# Patient Record
Sex: FEMALE | Race: WHITE
[De-identification: names, ages, dates, MRNs, and addresses within clinical notes are randomized per-mention and may not be internally consistent; named-entity substitution may affect disease eponyms.]

---

## 2019-04-13 ENCOUNTER — HOSPITAL ENCOUNTER (INPATIENT)
Dept: HOSPITAL 26 - MED | Age: 39
LOS: 2 days | Discharge: HOME | DRG: 284 | End: 2019-04-15
Attending: GENERAL PRACTICE | Admitting: GENERAL PRACTICE
Payer: MEDICAID

## 2019-04-13 VITALS — WEIGHT: 179 LBS | BODY MASS INDEX: 35.14 KG/M2 | HEIGHT: 60 IN

## 2019-04-13 VITALS — DIASTOLIC BLOOD PRESSURE: 61 MMHG | SYSTOLIC BLOOD PRESSURE: 115 MMHG

## 2019-04-13 DIAGNOSIS — E66.9: ICD-10-CM

## 2019-04-13 DIAGNOSIS — Z71.3: ICD-10-CM

## 2019-04-13 DIAGNOSIS — Z80.9: ICD-10-CM

## 2019-04-13 DIAGNOSIS — Z82.49: ICD-10-CM

## 2019-04-13 DIAGNOSIS — Z98.84: ICD-10-CM

## 2019-04-13 DIAGNOSIS — K80.10: Primary | ICD-10-CM

## 2019-04-13 LAB
ALBUMIN FLD-MCNC: 3.4 G/DL (ref 3.4–5)
ANION GAP SERPL CALCULATED.3IONS-SCNC: 11.4 MMOL/L (ref 8–16)
APPEARANCE UR: (no result)
AST SERPL-CCNC: 138 U/L (ref 15–37)
BASOPHILS # BLD AUTO: 0 K/UL (ref 0–0.22)
BASOPHILS NFR BLD AUTO: 0.2 % (ref 0–2)
BILIRUB SERPL-MCNC: 1.8 MG/DL (ref 0–1)
BILIRUB UR QL STRIP: (no result)
BUN SERPL-MCNC: 15 MG/DL (ref 7–18)
CHLORIDE SERPL-SCNC: 106 MMOL/L (ref 98–107)
CO2 SERPL-SCNC: 27.9 MMOL/L (ref 21–32)
COLOR UR: (no result)
CREAT SERPL-MCNC: 0.9 MG/DL (ref 0.6–1.3)
EOSINOPHIL # BLD AUTO: 0.1 K/UL (ref 0–0.4)
EOSINOPHIL NFR BLD AUTO: 1.3 % (ref 0–4)
ERYTHROCYTE [DISTWIDTH] IN BLOOD BY AUTOMATED COUNT: 13.8 % (ref 11.6–13.7)
GFR SERPL CREATININE-BSD FRML MDRD: 91 ML/MIN (ref 90–?)
GLUCOSE SERPL-MCNC: 90 MG/DL (ref 74–106)
GLUCOSE UR STRIP-MCNC: NEGATIVE MG/DL
HCT VFR BLD AUTO: 38.4 % (ref 36–48)
HGB BLD-MCNC: 12.7 G/DL (ref 12–16)
HGB UR QL STRIP: NEGATIVE
LEUKOCYTE ESTERASE UR QL STRIP: NEGATIVE
LIPASE SERPL-CCNC: 237 U/L (ref 73–393)
LYMPHOCYTES # BLD AUTO: 1.9 K/UL (ref 2.5–16.5)
LYMPHOCYTES NFR BLD AUTO: 26.9 % (ref 20.5–51.1)
MCH RBC QN AUTO: 30 PG (ref 27–31)
MCHC RBC AUTO-ENTMCNC: 33 G/DL (ref 33–37)
MCV RBC AUTO: 88.8 FL (ref 80–94)
MONOCYTES # BLD AUTO: 0.4 K/UL (ref 0.8–1)
MONOCYTES NFR BLD AUTO: 6.1 % (ref 1.7–9.3)
NEUTROPHILS # BLD AUTO: 4.6 K/UL (ref 1.8–7.7)
NEUTROPHILS NFR BLD AUTO: 65.5 % (ref 42.2–75.2)
NITRITE UR QL STRIP: NEGATIVE
PH UR STRIP: 6 [PH] (ref 5–9)
PLATELET # BLD AUTO: 263 K/UL (ref 140–450)
POTASSIUM SERPL-SCNC: 4.3 MMOL/L (ref 3.5–5.1)
RBC # BLD AUTO: 4.32 MIL/UL (ref 4.2–5.4)
SODIUM SERPL-SCNC: 141 MMOL/L (ref 136–145)
WBC # BLD AUTO: 7 K/UL (ref 4.8–10.8)

## 2019-04-13 PROCEDURE — A9510 TC99M DISOFENIN: HCPCS

## 2019-04-13 NOTE — NUR
PT REPORTS LOWER BACK PAIN X2 MONTHS THAT EXACERBATED LAST NIGHT AND NOW 
RADIATES TO THE CENTER OF ABDOMEN, AND IS NOT RELIEVED BY IBUPROFEN. PT DENIES 
ANY TRAUMA/INJURY, DYSURIA, N/V/D/FEVER, NO OBVIOUS DEFORMITY/BRUISING NOTED. 
SKIN IS PINK/WARM/DRY; AAOX4 WITH EVEN AND STEADY GAIT; LUNGS CLEAR BL; HR EVEN 
AND REGULAR; VSS; PATIENT POSITIONED FOR COMFORT; HOB ELEVATED; BEDRAILS UP X1; 
BED DOWN. ER MD MADE AWARE OF PT STATUS.

## 2019-04-14 VITALS — DIASTOLIC BLOOD PRESSURE: 70 MMHG | SYSTOLIC BLOOD PRESSURE: 118 MMHG

## 2019-04-14 VITALS — SYSTOLIC BLOOD PRESSURE: 95 MMHG | DIASTOLIC BLOOD PRESSURE: 50 MMHG

## 2019-04-14 VITALS — SYSTOLIC BLOOD PRESSURE: 97 MMHG | DIASTOLIC BLOOD PRESSURE: 59 MMHG

## 2019-04-14 LAB
AMYLASE SERPL-CCNC: 102 U/L (ref 25–115)
ANION GAP SERPL CALCULATED.3IONS-SCNC: 11.2 MMOL/L (ref 8–16)
BARBITURATES UR QL SCN: (no result) NG/ML
BASOPHILS # BLD AUTO: 0 K/UL (ref 0–0.22)
BASOPHILS NFR BLD AUTO: 0.2 % (ref 0–2)
BENZODIAZ UR QL SCN: (no result) NG/ML
BUN SERPL-MCNC: 14 MG/DL (ref 7–18)
BZE UR QL SCN: (no result) NG/ML
CANNABINOIDS UR QL SCN: (no result) NG/ML
CHLORIDE SERPL-SCNC: 107 MMOL/L (ref 98–107)
CHOLEST/HDLC SERPL: 2.5 {RATIO} (ref 1–4.5)
CO2 SERPL-SCNC: 27.8 MMOL/L (ref 21–32)
CREAT SERPL-MCNC: 0.7 MG/DL (ref 0.6–1.3)
EOSINOPHIL # BLD AUTO: 0.1 K/UL (ref 0–0.4)
EOSINOPHIL NFR BLD AUTO: 1.5 % (ref 0–4)
ERYTHROCYTE [DISTWIDTH] IN BLOOD BY AUTOMATED COUNT: 13.7 % (ref 11.6–13.7)
GFR SERPL CREATININE-BSD FRML MDRD: 121 ML/MIN (ref 90–?)
GLUCOSE SERPL-MCNC: 82 MG/DL (ref 74–106)
HCT VFR BLD AUTO: 37 % (ref 36–48)
HDLC SERPL-MCNC: 60 MG/DL (ref 40–60)
HGB BLD-MCNC: 12.3 G/DL (ref 12–16)
LDLC SERPL CALC-MCNC: 82 MG/DL (ref 60–100)
LIPASE SERPL-CCNC: 243 U/L (ref 73–393)
LYMPHOCYTES # BLD AUTO: 1.8 K/UL (ref 2.5–16.5)
LYMPHOCYTES NFR BLD AUTO: 35 % (ref 20.5–51.1)
MAGNESIUM SERPL-MCNC: 2.3 MG/DL (ref 1.8–2.4)
MCH RBC QN AUTO: 30 PG (ref 27–31)
MCHC RBC AUTO-ENTMCNC: 33 G/DL (ref 33–37)
MCV RBC AUTO: 89.8 FL (ref 80–94)
MONOCYTES # BLD AUTO: 0.4 K/UL (ref 0.8–1)
MONOCYTES NFR BLD AUTO: 7.5 % (ref 1.7–9.3)
NEUTROPHILS # BLD AUTO: 2.8 K/UL (ref 1.8–7.7)
NEUTROPHILS NFR BLD AUTO: 55.8 % (ref 42.2–75.2)
OPIATES UR QL SCN: (no result) NG/ML
PCP UR QL SCN: (no result) NG/ML
PHOSPHATE SERPL-MCNC: 4.1 MG/DL (ref 2.5–4.9)
PLATELET # BLD AUTO: 246 K/UL (ref 140–450)
POTASSIUM SERPL-SCNC: 4 MMOL/L (ref 3.5–5.1)
PROTHROMBIN TIME: 9.7 SECS (ref 10.8–13.4)
RBC # BLD AUTO: 4.12 MIL/UL (ref 4.2–5.4)
SODIUM SERPL-SCNC: 142 MMOL/L (ref 136–145)
T4 FREE SERPL-MCNC: 1.1 NG/DL (ref 0.76–1.46)
TRIGL SERPL-MCNC: 50 MG/DL (ref 30–150)
TSH SERPL DL<=0.05 MIU/L-ACNC: 2.28 UIU/ML (ref 0.34–3.74)
WBC # BLD AUTO: 5 K/UL (ref 4.8–10.8)

## 2019-04-14 RX ADMIN — METRONIDAZOLE SCH MLS/HR: 500 SOLUTION INTRAVENOUS at 12:03

## 2019-04-14 RX ADMIN — METRONIDAZOLE SCH MLS/HR: 500 SOLUTION INTRAVENOUS at 20:43

## 2019-04-14 RX ADMIN — DEXTROSE AND SODIUM CHLORIDE SCH MLS/HR: 5; .9 INJECTION, SOLUTION INTRAVENOUS at 07:19

## 2019-04-14 RX ADMIN — DEXTROSE AND SODIUM CHLORIDE SCH MLS/HR: 5; .9 INJECTION, SOLUTION INTRAVENOUS at 14:28

## 2019-04-14 RX ADMIN — DOCUSATE SODIUM SCH MG: 100 CAPSULE, LIQUID FILLED ORAL at 20:43

## 2019-04-14 RX ADMIN — DOCUSATE SODIUM SCH MG: 100 CAPSULE, LIQUID FILLED ORAL at 09:56

## 2019-04-14 RX ADMIN — DEXTROSE AND SODIUM CHLORIDE SCH MLS/HR: 5; .9 INJECTION, SOLUTION INTRAVENOUS at 00:10

## 2019-04-14 NOTE — NUR
REPORT RECEIVED FROM NIGHT SHIFT NURSE, PT SLEEPING QUIETLY, AROUSES EASILY, RESP EVEN 
UNLABORED ON RA, SKIN WARM DRY COLOR WNL, NO C/O PAIN, POC REVIEWED, ALL SAFETY MEASURES IN 
PLACE, DENIES ANY IMMEDIATE NEEDS, WILL CONTINUE TO MONITOR.

## 2019-04-14 NOTE — NUR
PATIENT SITTING IN BED CONVERSING WITH ROOMMATE, NO SIGNS OF DISTRESS ON RA, BED LOW, ALL 
LIGHT IN REACH. WILL CONTINUE TO MONITOR

## 2019-04-14 NOTE — NUR
RECEIVED BEDSIDE REPORT FROM DAY SHIFT RN, PATIENT LYING IN BED IN STABLE CONDITION WITH NO 
SIGNS OF DISTRESS ON RA. BED IS LOW, CALL LIGHT WITHIN REACH, WILL CONTINUE TO MONITOR.

## 2019-04-14 NOTE — NUR
ADMINISTERED SCHEDULED MEDICATIONS, PATIENT RESTING COMFORTABLY IN BED, NO SIGNS OF DISTRESS 
OR C/O PAIN, WILL CONTINUE TO MONITOR

## 2019-04-14 NOTE — NUR
FIRST DOSE OF FLAGYL  MG INFUSING AS ORDERED TO START NOW AS INITIAL DOSE.  PT. AWAKE 
AND ALERT. ABLE TO VERBALIZE NEEDS WELL.

## 2019-04-14 NOTE — NUR
PATIENT HAS BEEN SCREENED AND CATEGORIZED AS MODERATE NUTRITION RISK. PATIENT WILL BE SEEN 
WITHIN 3-5 DAYS OF ADMISSION.



04/16/19-04/18/19



LALITHA AYALA MS, RDN

## 2019-04-14 NOTE — NUR
PT TOLERATED REGULAR DINNER WELL WITHOUT N/V, DENIES PAIN, PT STATES SHE STILL HAS NOT HAD 
BM SINCE 3 DAYS AGO, REQUEST MORE MEDS TO HELP WITH BM, PT ENCOURAGED TO INCREASE FLUID 
INTAKE AND PRUNE JUICE GIVEN, WILL REPORT TO NIGHT SHIFT NURSE FOR ANOTHER DOSE OF COLACE.

## 2019-04-14 NOTE — NUR
Patient will be admitted to care of DR RAMIREZ. Admited to 106-B.  Will go to 
room TELE 106-B. Belongings list completed.  Report to LIZETH ROBERSON.

## 2019-04-14 NOTE — NUR
PT. SLEEPING. NO NOTED RESTLESSNESS. NO RASHES NOTED TO SKIN. NO S/S OF ALLERGIC REACTIONS 
TO FLAGYL IVP INFUSING.

## 2019-04-14 NOTE — NUR
RECEIVED FROM ER PER WHEELCHAIR AWAKE AND ALERT. PT. AMBULATING WELL TO RESTROOM INSIDE 
ROOM. ROM X 4. CLEAR SPEECH. NO SOB. DENIES PAIN AT THIS TIME RT MEDICATED IN ER WITH 
MORPHINE AND TORADOL IVP. DX. OF CHOLECYSTITIS. MEDICAL SURGICAL PT. CARE PLANS DISCUSSED 
WITH HER AND CALL LIGHT WITH IN REACH. ORIENTED TO ROOM , RAPID RESPONSE AND CARE GIVERS. 
IVF SITE TO LAC# 20 INTACT AND NO INFILTRATION NOTED.  SKIN INTACT.  NO EDEMA. CTAB.

## 2019-04-14 NOTE — NUR
NO COMPLAINTS OF ANY PAIN THIS SHIFT. A/O X 4. ROM X 4. WILL ENDORSE TO AM RN FOR CONTINUITY 
OF CARE. VERBALIZES WELL. CXR TAKEN.  REMINDED NPO EXCEPT MEDICATIONS. "OK"

## 2019-04-14 NOTE — NUR
PT RETURNED BACK FROM [facility]IDA SCAN, PT PLACED BACK ON IVF, IV ISTE WNL, PT DENIES PAIN, PT 
TALKING WITH FAMILY MEMBER LAUGHING, SMILING, ALL SAFETY MEASURES IN PLACE, WILL CONTINUE TO 
MONITOR.

## 2019-04-15 VITALS — DIASTOLIC BLOOD PRESSURE: 55 MMHG | SYSTOLIC BLOOD PRESSURE: 118 MMHG

## 2019-04-15 VITALS — DIASTOLIC BLOOD PRESSURE: 68 MMHG | SYSTOLIC BLOOD PRESSURE: 108 MMHG

## 2019-04-15 LAB
ANION GAP SERPL CALCULATED.3IONS-SCNC: 9.2 MMOL/L (ref 8–16)
BASOPHILS # BLD AUTO: 0 K/UL (ref 0–0.22)
BASOPHILS NFR BLD AUTO: 0.2 % (ref 0–2)
BUN SERPL-MCNC: 13 MG/DL (ref 7–18)
CHLORIDE SERPL-SCNC: 107 MMOL/L (ref 98–107)
CO2 SERPL-SCNC: 28.3 MMOL/L (ref 21–32)
CREAT SERPL-MCNC: 0.7 MG/DL (ref 0.6–1.3)
EOSINOPHIL # BLD AUTO: 0.1 K/UL (ref 0–0.4)
EOSINOPHIL NFR BLD AUTO: 2.5 % (ref 0–4)
ERYTHROCYTE [DISTWIDTH] IN BLOOD BY AUTOMATED COUNT: 13.6 % (ref 11.6–13.7)
GFR SERPL CREATININE-BSD FRML MDRD: 121 ML/MIN (ref 90–?)
GLUCOSE SERPL-MCNC: 93 MG/DL (ref 74–106)
HCT VFR BLD AUTO: 37.6 % (ref 36–48)
HGB BLD-MCNC: 12.2 G/DL (ref 12–16)
LYMPHOCYTES # BLD AUTO: 2.1 K/UL (ref 2.5–16.5)
LYMPHOCYTES NFR BLD AUTO: 43.4 % (ref 20.5–51.1)
MAGNESIUM SERPL-MCNC: 1.9 MG/DL (ref 1.8–2.4)
MCH RBC QN AUTO: 29 PG (ref 27–31)
MCHC RBC AUTO-ENTMCNC: 33 G/DL (ref 33–37)
MCV RBC AUTO: 89.4 FL (ref 80–94)
MONOCYTES # BLD AUTO: 0.4 K/UL (ref 0.8–1)
MONOCYTES NFR BLD AUTO: 9.1 % (ref 1.7–9.3)
NEUTROPHILS # BLD AUTO: 2.1 K/UL (ref 1.8–7.7)
NEUTROPHILS NFR BLD AUTO: 44.8 % (ref 42.2–75.2)
PHOSPHATE SERPL-MCNC: 3.9 MG/DL (ref 2.5–4.9)
PLATELET # BLD AUTO: 241 K/UL (ref 140–450)
POTASSIUM SERPL-SCNC: 4.5 MMOL/L (ref 3.5–5.1)
RBC # BLD AUTO: 4.21 MIL/UL (ref 4.2–5.4)
SODIUM SERPL-SCNC: 140 MMOL/L (ref 136–145)
WBC # BLD AUTO: 4.7 K/UL (ref 4.8–10.8)

## 2019-04-15 RX ADMIN — DOCUSATE SODIUM SCH MG: 100 CAPSULE, LIQUID FILLED ORAL at 08:50

## 2019-04-15 RX ADMIN — METRONIDAZOLE SCH MLS/HR: 500 SOLUTION INTRAVENOUS at 05:12

## 2019-04-15 NOTE — NUR
RECEIVED REPORT FROM NIGHT SHIFT RN AT BEDSIDE. PT IS EASILY ROUSED, AOX4, NO DISTRESS NOTED 
ON RM AIR. PT IS CALM, COOPERATIVE. SKIN WARM TO TOUCH, INTACT. LUNGS CTA ON ALL LOBES. 
DENIES ANY PAIN AND NAUSEA. IV CATH TO THE LEFT AC, 22G, INTACT AND ASYMPTOMATIC. POC 
DISCUSSED, PATIENT VERBALIZED UNDERSTANDING. SAFETY MEASURES IN PLACE, CALL LIGHT WITHIN 
REACH. WILL CONTINUE TO MONITOR.

## 2019-04-15 NOTE — NUR
PT FINISHED LUNCH AND LEFT WITH ALL HER BELONGINGS. PT DENIES PAIN AND WALKED TO LOBBY BY 
HER SELF.

## 2019-04-15 NOTE — NUR
PT DISCHARGED PER MD ORDER. PT WAS INSTRUCTED TO FOLLOW UP WITH THE APPT ON APRIL 20TH AT 
[facility]. ALL IMAGE TEST REPORT PROVIDED TO PT AS REQUESTED. PT IS AWARE THAT SHE 
NEEDS FOLLOW UP WITH PCP AND DR VELASCO GENERAL SURGEON IF SHE WANTS ELECTIVE 
CHOLECYSTECTOMY. DISCHARGE INSTRUCTIONS AND MED TEACHING PROVIDED. NO RX ACCORDING TO DR OBRIEN. PT CAN GET IBUPROFEN OTC. PT VERBALIZED UNDERSTANDING. COME BACK TO ER IF SYMPTOMS 
IS WORSE OR NEW SYMPTOMS APPEAR SUCH AS FEVER OR WORSENING OF PAIN, ETC. PT VERBALIZED 
UNDERSTANDING. REMOVED PT'S IV, TIP INTACT. PRESSURE APPLIED.